# Patient Record
Sex: MALE | Race: WHITE | URBAN - METROPOLITAN AREA
[De-identification: names, ages, dates, MRNs, and addresses within clinical notes are randomized per-mention and may not be internally consistent; named-entity substitution may affect disease eponyms.]

---

## 2017-04-28 ENCOUNTER — HOSPITAL ENCOUNTER (OUTPATIENT)
Dept: CT IMAGING | Facility: HOSPITAL | Age: 42
Discharge: HOME | End: 2017-04-28
Attending: FAMILY MEDICINE
Payer: MEDICAID

## 2017-04-28 DIAGNOSIS — I26.09 OTHER PULMONARY EMBOLISM WITH ACUTE COR PULMONALE (HCC): ICD-10-CM

## 2017-04-28 PROCEDURE — 82565 ASSAY OF CREATININE: CPT

## 2017-04-28 PROCEDURE — 71260 CT THORAX DX C+: CPT

## 2017-05-03 ENCOUNTER — HOSPITAL ENCOUNTER (OUTPATIENT)
Dept: ULTRASOUND IMAGING | Facility: HOSPITAL | Age: 42
Discharge: HOME | End: 2017-05-03
Attending: FAMILY MEDICINE
Payer: MEDICAID

## 2017-05-03 DIAGNOSIS — I26.09 OTHER PULMONARY EMBOLISM WITH ACUTE COR PULMONALE (HCC): ICD-10-CM

## 2017-05-03 PROCEDURE — 93970 EXTREMITY STUDY: CPT

## 2018-08-30 ENCOUNTER — HOSPITAL (OUTPATIENT)
Dept: OTHER | Age: 43
End: 2018-08-30
Attending: INTERNAL MEDICINE

## 2018-08-30 LAB
A/G RATIO_: 1
ABS LYMPH: 1.8 K/CUMM (ref 1–3.5)
ABS MONO: 0.8 K/CUMM (ref 0.1–0.8)
ABS NEUTRO: 7.6 K/CUMM (ref 2–8)
ALBUMIN: 3.9 G/DL (ref 3.5–5)
ALK PHOS: 96 UNIT/L (ref 50–124)
ALT/GPT: 36 UNIT/L (ref 0–55)
ANION GAP SERPL CALC-SCNC: 14 MEQ/L (ref 10–20)
APTT PPP: 25 SECONDS (ref 22–30)
AST/GOT: 22 UNIT/L (ref 5–34)
BASOPHIL: 0 % (ref 0–1)
BILI TOTAL: 0.7 MG/DL (ref 0.2–1)
BUN SERPL-MCNC: 13 MG/DL (ref 6–20)
CALCIUM: 9.6 MG/DL (ref 8.4–10.2)
CHLORIDE: 103 MEQ/L (ref 97–107)
CREATININE: 0.84 MG/DL (ref 0.6–1.3)
D-DIMER: 2.89 MG/L FEU
DIFF_TYPE?: NORMAL
EOSINOPHIL: 1 % (ref 0–6)
GLOBULIN_: 3.8 G/DL (ref 2–4.1)
GLUCOSE LVL: 135 MG/DL (ref 70–99)
HCT VFR BLD CALC: 44 % (ref 36–51)
HEMOLYSIS 2+: NEGATIVE
HEMOLYSIS 4+: NEGATIVE
HEMOLYSIS 4+: NORMAL
HGB BLD-MCNC: 15.5 G/DL (ref 12–17)
ICTERIC 4+: NEGATIVE
ICTERIC 4+: NEGATIVE
IMMATURE GRAN: 0.4 % (ref 0–0.3)
INR: 1.7 (ref 0.9–1.1)
INSTR WBC: 10.3 K/CUMM (ref 4–11)
LIPEMIC 3+: NEGATIVE
LYMPHOCYTE: 17 %
MCH RBC QN AUTO: 31 PG (ref 25–35)
MCHC RBC AUTO-ENTMCNC: 35 G/DL (ref 32–37)
MCV RBC AUTO: 86 FL (ref 78–97)
MONOCYTE: 7 %
NEUTROPHIL: 73 %
NRBC BLD MANUAL-RTO: 0 % (ref 0–0.2)
PLATELET: 207 K/CUMM (ref 150–450)
POTASSIUM: 3.7 MEQ/L (ref 3.5–5.1)
PROTHROMBIN TIME: 16.6 SECONDS (ref 9.7–11.6)
RBC # BLD: 5.07 M/CUMM (ref 4.2–6)
RDW: 13.1 % (ref 11.5–14.5)
SODIUM: 137 MEQ/L (ref 136–145)
TCO2: 24 MEQ/L (ref 19–29)
TOTAL PROTEIN: 7.7 G/DL (ref 6.4–8.3)
TROPONIN I: 0.01 NG/ML
TROPONIN I: <0.01 NG/ML
WBC # BLD: 10.3 K/CUMM (ref 4–11)

## 2018-08-31 LAB
ABS NEUTRO: 6.5 K/CUMM (ref 2–8)
ANION GAP SERPL CALC-SCNC: 16 MEQ/L (ref 10–20)
APTT PPP: 48 SECONDS (ref 22–30)
APTT PPP: 64 SECONDS (ref 22–30)
BUN SERPL-MCNC: 13 MG/DL (ref 6–20)
CALCIUM: 8.9 MG/DL (ref 8.4–10.2)
CHLORIDE: 105 MEQ/L (ref 97–107)
CREATININE: 0.8 MG/DL (ref 0.6–1.3)
GLUCOSE LVL: 118 MG/DL (ref 70–99)
HCT VFR BLD CALC: 42 % (ref 36–51)
HEMOLYSIS 2+: ABNORMAL
HEMOLYSIS 4+: NEGATIVE
HGB BLD-MCNC: 14.9 G/DL (ref 12–17)
ICTERIC 4+: NEGATIVE
INSTR WBC: 10.4 K/CUMM (ref 4–11)
MCH RBC QN AUTO: 31 PG (ref 25–35)
MCHC RBC AUTO-ENTMCNC: 35 G/DL (ref 32–37)
MCV RBC AUTO: 88 FL (ref 78–97)
NRBC BLD MANUAL-RTO: 0 % (ref 0–0.2)
PLATELET: 191 K/CUMM (ref 150–450)
POTASSIUM: 4.1 MEQ/L (ref 3.5–5.1)
RBC # BLD: 4.82 M/CUMM (ref 4.2–6)
RDW: 13.2 % (ref 11.5–14.5)
SODIUM: 138 MEQ/L (ref 136–145)
TCO2: 21 MEQ/L (ref 19–29)
TROPONIN I: 0.01 NG/ML
WBC # BLD: 10.4 K/CUMM (ref 4–11)

## 2018-09-01 LAB
ABS NEUTRO: 3.5 K/CUMM (ref 2–8)
APTT PPP: 36 SECONDS (ref 22–30)
APTT PPP: 41 SECONDS (ref 22–30)
APTT PPP: 59 SECONDS (ref 22–30)
HCT VFR BLD CALC: 40 % (ref 36–51)
HGB BLD-MCNC: 13.8 G/DL (ref 12–17)
INR: 1.6 (ref 0.9–1.1)
INSTR WBC: 6.7 K/CUMM (ref 4–11)
MCH RBC QN AUTO: 31 PG (ref 25–35)
MCHC RBC AUTO-ENTMCNC: 34 G/DL (ref 32–37)
MCV RBC AUTO: 90 FL (ref 78–97)
NRBC BLD MANUAL-RTO: 0 % (ref 0–0.2)
PLATELET: 192 K/CUMM (ref 150–450)
PROTHROMBIN TIME: 15.1 SECONDS (ref 9.7–11.6)
RBC # BLD: 4.45 M/CUMM (ref 4.2–6)
RDW: 12.9 % (ref 11.5–14.5)
WBC # BLD: 6.7 K/CUMM (ref 4–11)

## 2018-09-02 LAB
ABS NEUTRO: 3.7 K/CUMM (ref 2–8)
APTT PPP: 54 SECONDS (ref 22–30)
APTT PPP: 59 SECONDS (ref 22–30)
HCT VFR BLD CALC: 39 % (ref 36–51)
HGB BLD-MCNC: 13.8 G/DL (ref 12–17)
INR: 1.4 (ref 0.9–1.1)
INSTR WBC: 7.2 K/CUMM (ref 4–11)
MCH RBC QN AUTO: 31 PG (ref 25–35)
MCHC RBC AUTO-ENTMCNC: 35 G/DL (ref 32–37)
MCV RBC AUTO: 88 FL (ref 78–97)
NRBC BLD MANUAL-RTO: 0 % (ref 0–0.2)
PLATELET: 215 K/CUMM (ref 150–450)
PROTHROMBIN TIME: 14.1 SECONDS (ref 9.7–11.6)
RBC # BLD: 4.47 M/CUMM (ref 4.2–6)
RDW: 13.1 % (ref 11.5–14.5)
WBC # BLD: 7.2 K/CUMM (ref 4–11)

## 2018-09-03 LAB
ABS NEUTRO: 3.3 K/CUMM (ref 2–8)
APTT PPP: 40 SECONDS (ref 22–30)
APTT PPP: 47 SECONDS (ref 22–30)
APTT PPP: 79 SECONDS (ref 22–30)
HCT VFR BLD CALC: 40 % (ref 36–51)
HGB BLD-MCNC: 14.1 G/DL (ref 12–17)
INR: 1.7 (ref 0.9–1.1)
INSTR WBC: 6.8 K/CUMM (ref 4–11)
MCH RBC QN AUTO: 31 PG (ref 25–35)
MCHC RBC AUTO-ENTMCNC: 36 G/DL (ref 32–37)
MCV RBC AUTO: 87 FL (ref 78–97)
NRBC BLD MANUAL-RTO: 0 % (ref 0–0.2)
PLATELET: 222 K/CUMM (ref 150–450)
PROTHROMBIN TIME: 16.2 SECONDS (ref 9.7–11.6)
RBC # BLD: 4.55 M/CUMM (ref 4.2–6)
RDW: 13 % (ref 11.5–14.5)
WBC # BLD: 6.8 K/CUMM (ref 4–11)

## 2018-09-04 LAB
ABS NEUTRO: 3.4 K/CUMM (ref 2–8)
APTT PPP: 53 SECONDS (ref 22–30)
APTT PPP: 55 SECONDS (ref 22–30)
HCT VFR BLD CALC: 42 % (ref 36–51)
HGB BLD-MCNC: 14.6 G/DL (ref 12–17)
INR: 1.8 (ref 0.9–1.1)
INSTR WBC: 6.3 K/CUMM (ref 4–11)
MCH RBC QN AUTO: 32 PG (ref 25–35)
MCHC RBC AUTO-ENTMCNC: 35 G/DL (ref 32–37)
MCV RBC AUTO: 90 FL (ref 78–97)
NRBC BLD MANUAL-RTO: 0 % (ref 0–0.2)
PLATELET: 244 K/CUMM (ref 150–450)
PROTHROMBIN TIME: 17.3 SECONDS (ref 9.7–11.6)
RBC # BLD: 4.64 M/CUMM (ref 4.2–6)
RDW: 13 % (ref 11.5–14.5)
WBC # BLD: 6.3 K/CUMM (ref 4–11)

## 2018-09-05 LAB
ABS NEUTRO: 4 K/CUMM (ref 2–8)
APTT PPP: 54 SECONDS (ref 22–30)
HCT VFR BLD CALC: 40 % (ref 36–51)
HGB BLD-MCNC: 14.2 G/DL (ref 12–17)
INR: 2 (ref 0.9–1.1)
INSTR WBC: 7.5 K/CUMM (ref 4–11)
MCH RBC QN AUTO: 31 PG (ref 25–35)
MCHC RBC AUTO-ENTMCNC: 35 G/DL (ref 32–37)
MCV RBC AUTO: 87 FL (ref 78–97)
NRBC BLD MANUAL-RTO: 0 % (ref 0–0.2)
PLATELET: 242 K/CUMM (ref 150–450)
PROTHROMBIN TIME: 18.6 SECONDS (ref 9.7–11.6)
RBC # BLD: 4.62 M/CUMM (ref 4.2–6)
RDW: 13.2 % (ref 11.5–14.5)
WBC # BLD: 7.5 K/CUMM (ref 4–11)

## 2018-09-06 LAB
ABS NEUTRO: 3.9 K/CUMM (ref 2–8)
APTT PPP: 64 SECONDS (ref 22–30)
HCT VFR BLD CALC: 42 % (ref 36–51)
HGB BLD-MCNC: 15.1 G/DL (ref 12–17)
INR: 2 (ref 0.9–1.1)
INSTR WBC: 7.7 K/CUMM (ref 4–11)
MCH RBC QN AUTO: 32 PG (ref 25–35)
MCHC RBC AUTO-ENTMCNC: 36 G/DL (ref 32–37)
MCV RBC AUTO: 89 FL (ref 78–97)
NRBC BLD MANUAL-RTO: 0 % (ref 0–0.2)
PLATELET: 257 K/CUMM (ref 150–450)
PROTHROMBIN TIME: 19.2 SECONDS (ref 9.7–11.6)
RBC # BLD: 4.69 M/CUMM (ref 4.2–6)
RDW: 13.2 % (ref 11.5–14.5)
WBC # BLD: 7.7 K/CUMM (ref 4–11)

## 2018-12-02 ENCOUNTER — HOSPITAL (OUTPATIENT)
Dept: OTHER | Age: 43
End: 2018-12-02
Attending: FAMILY MEDICINE

## 2020-09-03 ENCOUNTER — HOSPITAL (OUTPATIENT)
Dept: OTHER | Age: 45
End: 2020-09-03
Attending: EMERGENCY MEDICINE

## 2020-09-03 LAB
A/G RATIO_: 0.9
A/G RATIO_: 0.9
ABS LYMPH: 1.6 K/CUMM (ref 1–3.5)
ABS LYMPH: 1.6 K/CUMM (ref 1–3.5)
ABS MONO: 0.5 K/CUMM (ref 0.1–0.8)
ABS MONO: 0.5 K/CUMM (ref 0.1–0.8)
ABS NEUTRO: 4.5 K/CUMM (ref 2–8)
ABS NEUTRO: 4.5 K/CUMM (ref 2–8)
ALBUMIN: 3.7 G/DL (ref 3.5–5)
ALBUMIN: 3.7 G/DL (ref 3.5–5)
ALK PHOS: 93 UNIT/L (ref 50–124)
ALK PHOS: 93 UNIT/L (ref 50–124)
ALT/GPT: 36 UNIT/L (ref 0–55)
ALT/GPT: 36 UNIT/L (ref 0–55)
ANION GAP SERPL CALC-SCNC: 14 MEQ/L (ref 10–20)
ANION GAP SERPL CALC-SCNC: 14 MEQ/L (ref 10–20)
APTT PPP: 23 SECOND(S) (ref 22–30)
APTT PPP: 23 SECONDS (ref 22–30)
AST/GOT: 21 UNIT/L (ref 5–34)
AST/GOT: 21 UNIT/L (ref 5–34)
BASOPHIL: 1 % (ref 0–1)
BASOPHIL: 1 % (ref 0–1)
BILI TOTAL: 0.3 MG/DL (ref 0.2–1)
BILI TOTAL: 0.3 MG/DL (ref 0.2–1)
BUN SERPL-MCNC: 13 MG/DL (ref 6–20)
BUN SERPL-MCNC: 13 MG/DL (ref 6–20)
CALCIUM: 9 MG/DL (ref 8.4–10.2)
CALCIUM: 9 MG/DL (ref 8.4–10.2)
CHLORIDE: 103 MEQ/L (ref 97–107)
CHLORIDE: 103 MEQ/L (ref 97–107)
CREATININE: 0.92 MG/DL (ref 0.6–1.3)
CREATININE: 0.92 MG/DL (ref 0.6–1.3)
CRP INFLAMMATION: 8.5 MG/L (ref 0.1–8.2)
CRP INFLAMMATION: 8.5 MG/L (ref 0.1–8.2)
DIFF_TYPE?: NORMAL
EOSINOPHIL: 2 % (ref 0–6)
EOSINOPHIL: 2 % (ref 0–6)
FERRITIN: 161.6 NG/ML (ref 22–275)
FERRITIN: 161.6 NG/ML (ref 22–275)
GLOBULIN_: 4.2 G/DL (ref 2–4.1)
GLOBULIN_: 4.2 G/DL (ref 2–4.1)
GLUCOSE LVL: 121 MG/DL (ref 70–99)
GLUCOSE LVL: 121 MG/DL (ref 70–99)
HCT VFR BLD CALC: 40 % (ref 36–51)
HCT VFR BLD CALC: 40 % (ref 36–51)
HEMOLYSIS 1+: NEGATIVE
HEMOLYSIS 2+: NEGATIVE
HEMOLYSIS 3+: NEGATIVE
HEMOLYSIS 4+: NEGATIVE
HGB BLD-MCNC: 14.4 G/DL (ref 12–17)
HGB BLD-MCNC: 14.4 G/DL (ref 12–17)
ICTERIC 4+: NEGATIVE
IMMATURE GRAN: 0.4 % (ref 0–0.3)
IMMATURE GRAN: 0.4 % (ref 0–0.3)
INR: 1.1 (ref 0.9–1.1)
INR: 1.1 (ref 0.9–1.1)
INSTR WBC: 6.9 K/CUMM (ref 4–11)
LACTIC ACID: 1.5 MMOL/L (ref 0.5–2)
LACTIC ACID: 1.5 MMOL/L (ref 0.5–2)
LDH SERPL L TO P-CCNC: 193 UNIT/L (ref 125–220)
LDH SERPL L TO P-CCNC: 193 UNIT/L (ref 125–220)
LIPEMIC 1+: NEGATIVE
LIPEMIC 3+: ABNORMAL
LYMPHOCYTE: 23 %
LYMPHOCYTE: 23 %
MCH RBC QN AUTO: 32 PG (ref 25–35)
MCH RBC QN AUTO: 32 PG (ref 25–35)
MCHC RBC AUTO-ENTMCNC: 36 G/DL (ref 32–37)
MCHC RBC AUTO-ENTMCNC: 36 G/DL (ref 32–37)
MCV RBC AUTO: 88 FL (ref 78–97)
MCV RBC AUTO: 88 FL (ref 78–97)
MONOCYTE: 8 %
MONOCYTE: 8 %
NEUTROPHIL: 65 %
NEUTROPHIL: 65 %
NRBC BLD MANUAL-RTO: 0 % (ref 0–0.2)
NRBC BLD MANUAL-RTO: 0 % (ref 0–0.2)
PLATELET: 307 K/CUMM (ref 150–450)
PLATELET: 307 K/CUMM (ref 150–450)
POTASSIUM: 3.9 MEQ/L (ref 3.5–5.1)
POTASSIUM: 3.9 MEQ/L (ref 3.5–5.1)
PROCALCITONIN LVL: <0.05
PROCALCITONIN LVL: <0.05
PROTHROMBIN TIME: 11.3 SECOND(S) (ref 9.7–11.6)
PROTHROMBIN TIME: 11.3 SECONDS (ref 9.7–11.6)
RBC # BLD: 4.49 M/CUMM (ref 4.2–6)
RBC # BLD: 4.49 M/CUMM (ref 4.2–6)
RDW: 13.2 % (ref 11.5–14.5)
RDW: 13.2 % (ref 11.5–14.5)
SODIUM: 138 MEQ/L (ref 136–145)
SODIUM: 138 MEQ/L (ref 136–145)
TCO2: 25 MEQ/L (ref 19–29)
TCO2: 25 MEQ/L (ref 19–29)
TOTAL PROTEIN: 7.9 G/DL (ref 6.4–8.3)
TOTAL PROTEIN: 7.9 G/DL (ref 6.4–8.3)
WBC # BLD: 6.9 K/CUMM (ref 4–11)
WBC # BLD: 6.9 K/CUMM (ref 4–11)

## 2020-09-04 LAB
REPORT TEXT: NORMAL
REPORT TEXT: NORMAL

## 2021-10-03 ENCOUNTER — WALK IN (OUTPATIENT)
Dept: URGENT CARE | Age: 46
End: 2021-10-03

## 2021-10-03 VITALS
SYSTOLIC BLOOD PRESSURE: 138 MMHG | TEMPERATURE: 97.8 F | RESPIRATION RATE: 18 BRPM | OXYGEN SATURATION: 97 % | HEART RATE: 70 BPM | DIASTOLIC BLOOD PRESSURE: 87 MMHG

## 2021-10-03 DIAGNOSIS — Z20.2 POSSIBLE EXPOSURE TO STD: Primary | ICD-10-CM

## 2021-10-03 LAB
APPEARANCE, POC: ABNORMAL
BILIRUBIN, POC: NEGATIVE
COLOR, POC: YELLOW
GLUCOSE UR-MCNC: NEGATIVE MG/DL
KETONES, POC: NEGATIVE MG/DL
NITRITE, POC: NEGATIVE
OCCULT BLOOD, POC: NEGATIVE
PH UR: 6 [PH] (ref 5–7)
PROT UR-MCNC: NEGATIVE MG/DL
SP GR UR: 1.03 (ref 1–1.03)
UROBILINOGEN UR-MCNC: 0.2 MG/DL (ref 0–1)
WBC (LEUKOCYTE) ESTERASE, POC: ABNORMAL

## 2021-10-03 PROCEDURE — 99212 OFFICE O/P EST SF 10 MIN: CPT

## 2021-10-03 PROCEDURE — 81003 URINALYSIS AUTO W/O SCOPE: CPT | Performed by: FAMILY MEDICINE

## 2021-10-03 PROCEDURE — 87591 N.GONORRHOEAE DNA AMP PROB: CPT | Performed by: FAMILY MEDICINE

## 2021-10-03 PROCEDURE — 87086 URINE CULTURE/COLONY COUNT: CPT | Performed by: FAMILY MEDICINE

## 2021-10-03 PROCEDURE — 10002800 HB RX 250 W HCPCS: Performed by: FAMILY MEDICINE

## 2021-10-03 PROCEDURE — 96372 THER/PROPH/DIAG INJ SC/IM: CPT

## 2021-10-03 PROCEDURE — 10002801 HB RX 250 W/O HCPCS: Performed by: FAMILY MEDICINE

## 2021-10-03 RX ORDER — DOXYCYCLINE HYCLATE 100 MG
100 TABLET ORAL 2 TIMES DAILY
Qty: 14 TABLET | Refills: 0 | Status: SHIPPED | OUTPATIENT
Start: 2021-10-03 | End: 2021-10-10

## 2021-10-03 RX ADMIN — LIDOCAINE HYDROCHLORIDE 250 MG: 10 INJECTION, SOLUTION EPIDURAL; INFILTRATION; INTRACAUDAL; PERINEURAL at 16:41

## 2021-10-03 ASSESSMENT — ENCOUNTER SYMPTOMS
HEADACHES: 0
SINUS PAIN: 0
ABDOMINAL PAIN: 0
DIARRHEA: 0
FEVER: 0
ADENOPATHY: 0
SORE THROAT: 0
FATIGUE: 0
SHORTNESS OF BREATH: 0
EYE PAIN: 0
COUGH: 0
NAUSEA: 0
APPETITE CHANGE: 0
VOMITING: 0
CHEST TIGHTNESS: 0
DIZZINESS: 0
SLEEP DISTURBANCE: 0
WHEEZING: 0

## 2021-10-03 ASSESSMENT — PAIN SCALES - GENERAL: PAINLEVEL: 0

## 2021-10-05 LAB — BACTERIA UR CULT: NORMAL

## 2021-10-06 ENCOUNTER — TELEPHONE (OUTPATIENT)
Dept: URGENT CARE | Age: 46
End: 2021-10-06

## 2021-10-06 LAB
C TRACH RRNA UR QL NAA+PROBE: NEGATIVE
Lab: ABNORMAL
N GONORRHOEA RRNA UR QL NAA+PROBE: POSITIVE

## 2021-11-13 ENCOUNTER — WALK IN (OUTPATIENT)
Dept: URGENT CARE | Age: 46
End: 2021-11-13
Attending: EMERGENCY MEDICINE

## 2021-11-13 VITALS
OXYGEN SATURATION: 95 % | TEMPERATURE: 98 F | HEART RATE: 90 BPM | DIASTOLIC BLOOD PRESSURE: 84 MMHG | HEIGHT: 75 IN | BODY MASS INDEX: 31.08 KG/M2 | RESPIRATION RATE: 18 BRPM | WEIGHT: 250 LBS | SYSTOLIC BLOOD PRESSURE: 129 MMHG

## 2021-11-13 DIAGNOSIS — J30.9 ALLERGIC RHINITIS, UNSPECIFIED SEASONALITY, UNSPECIFIED TRIGGER: Primary | ICD-10-CM

## 2021-11-13 PROCEDURE — 99213 OFFICE O/P EST LOW 20 MIN: CPT

## 2021-11-13 RX ORDER — FLUTICASONE PROPIONATE 50 MCG
2 SPRAY, SUSPENSION (ML) NASAL DAILY
Qty: 16 G | Refills: 12 | Status: SHIPPED | OUTPATIENT
Start: 2021-11-13

## 2021-11-13 RX ORDER — DIPHENHYDRAMINE HCL 25 MG
25 CAPSULE ORAL EVERY 6 HOURS PRN
COMMUNITY
Start: 2021-11-13

## 2021-11-13 RX ORDER — AMOXICILLIN AND CLAVULANATE POTASSIUM 875; 125 MG/1; MG/1
1 TABLET, FILM COATED ORAL EVERY 12 HOURS
Qty: 20 TABLET | Refills: 0 | Status: SHIPPED | OUTPATIENT
Start: 2021-11-13

## 2021-11-13 ASSESSMENT — ENCOUNTER SYMPTOMS
COUGH: 0
BACK PAIN: 0
VOMITING: 0
FEVER: 0
CONFUSION: 0
NAUSEA: 0
CHILLS: 0
SHORTNESS OF BREATH: 0
HEADACHES: 0
ABDOMINAL PAIN: 0
SINUS PAIN: 0

## 2021-11-13 ASSESSMENT — PAIN SCALES - GENERAL: PAINLEVEL: 0

## 2021-12-29 ENCOUNTER — HOSPITAL ENCOUNTER (OUTPATIENT)
Dept: GENERAL RADIOLOGY | Age: 46
Discharge: HOME | End: 2021-12-29
Attending: EMERGENCY MEDICINE

## 2021-12-29 ENCOUNTER — WALK IN (OUTPATIENT)
Dept: URGENT CARE | Age: 46
End: 2021-12-29

## 2021-12-29 VITALS
RESPIRATION RATE: 18 BRPM | BODY MASS INDEX: 31.25 KG/M2 | HEART RATE: 87 BPM | DIASTOLIC BLOOD PRESSURE: 92 MMHG | OXYGEN SATURATION: 97 % | TEMPERATURE: 98.8 F | SYSTOLIC BLOOD PRESSURE: 134 MMHG | WEIGHT: 250 LBS

## 2021-12-29 DIAGNOSIS — M25.511 RIGHT SHOULDER PAIN, UNSPECIFIED CHRONICITY: Primary | ICD-10-CM

## 2021-12-29 DIAGNOSIS — M25.511 RIGHT SHOULDER PAIN, UNSPECIFIED CHRONICITY: ICD-10-CM

## 2021-12-29 PROCEDURE — 99212 OFFICE O/P EST SF 10 MIN: CPT

## 2021-12-29 PROCEDURE — 73030 X-RAY EXAM OF SHOULDER: CPT

## 2021-12-29 ASSESSMENT — ENCOUNTER SYMPTOMS
BACK PAIN: 0
VOMITING: 0
ABDOMINAL PAIN: 0
CONFUSION: 0
FEVER: 0
COUGH: 0
HEADACHES: 0
NAUSEA: 0
CHILLS: 0
SINUS PAIN: 0
SHORTNESS OF BREATH: 0

## 2021-12-29 ASSESSMENT — PAIN SCALES - GENERAL: PAINLEVEL: 6

## 2022-03-24 ENCOUNTER — HOSPITAL ENCOUNTER (OUTPATIENT)
Dept: GENERAL RADIOLOGY | Age: 47
Discharge: HOME | End: 2022-03-24
Attending: FAMILY MEDICINE

## 2022-03-24 ENCOUNTER — WALK IN (OUTPATIENT)
Dept: URGENT CARE | Age: 47
End: 2022-03-24
Attending: FAMILY MEDICINE

## 2022-03-24 VITALS
OXYGEN SATURATION: 97 % | HEART RATE: 85 BPM | TEMPERATURE: 98.2 F | SYSTOLIC BLOOD PRESSURE: 143 MMHG | RESPIRATION RATE: 14 BRPM | DIASTOLIC BLOOD PRESSURE: 76 MMHG

## 2022-03-24 DIAGNOSIS — M54.2 NECK PAIN: Primary | ICD-10-CM

## 2022-03-24 DIAGNOSIS — M54.2 NECK PAIN: ICD-10-CM

## 2022-03-24 PROCEDURE — 99212 OFFICE O/P EST SF 10 MIN: CPT

## 2022-03-24 PROCEDURE — 72050 X-RAY EXAM NECK SPINE 4/5VWS: CPT

## 2022-03-24 RX ORDER — CYCLOBENZAPRINE HCL 10 MG
10 TABLET ORAL 3 TIMES DAILY PRN
Qty: 30 TABLET | Refills: 0 | Status: SHIPPED | OUTPATIENT
Start: 2022-03-24 | End: 2022-03-24 | Stop reason: SDUPTHER

## 2022-03-24 RX ORDER — CYCLOBENZAPRINE HCL 10 MG
10 TABLET ORAL 3 TIMES DAILY PRN
Qty: 30 TABLET | Refills: 0 | Status: SHIPPED | OUTPATIENT
Start: 2022-03-24 | End: 2022-04-03

## 2022-03-24 ASSESSMENT — ENCOUNTER SYMPTOMS
POLYDIPSIA: 0
BACK PAIN: 0
EYE REDNESS: 0
HEADACHES: 0
AGITATION: 0
FEVER: 0
WOUND: 0
EYE PAIN: 0
VOMITING: 0
DIZZINESS: 0
ADENOPATHY: 0
WEAKNESS: 0
VOICE CHANGE: 0
NAUSEA: 0
STRIDOR: 0
CONSTIPATION: 0
POLYPHAGIA: 0
NERVOUS/ANXIOUS: 0
FATIGUE: 0
SPEECH DIFFICULTY: 0
WHEEZING: 0
COUGH: 0
COLOR CHANGE: 0
SINUS PAIN: 0
SORE THROAT: 0
DIARRHEA: 0
SHORTNESS OF BREATH: 0
BLOOD IN STOOL: 0
EYE DISCHARGE: 0
ABDOMINAL PAIN: 0
SINUS PRESSURE: 0
CHEST TIGHTNESS: 0
BRUISES/BLEEDS EASILY: 0

## 2022-03-24 ASSESSMENT — PAIN SCALES - GENERAL: PAINLEVEL: 7

## 2022-03-28 ENCOUNTER — TELEPHONE (OUTPATIENT)
Dept: URGENT CARE | Age: 47
End: 2022-03-28

## 2023-10-11 DIAGNOSIS — M75.41 IMPINGEMENT SYNDROME OF RIGHT SHOULDER: ICD-10-CM

## 2023-10-11 DIAGNOSIS — M23.91 INTERNAL DERANGEMENT OF KNEE, RIGHT: Primary | ICD-10-CM

## 2023-10-20 DIAGNOSIS — M23.91 INTERNAL DERANGEMENT OF KNEE, RIGHT: Primary | ICD-10-CM

## 2023-10-25 ENCOUNTER — APPOINTMENT (OUTPATIENT)
Dept: MRI IMAGING | Age: 48
End: 2023-10-25
Attending: ORTHOPAEDIC SURGERY

## 2023-11-02 ENCOUNTER — HOSPITAL ENCOUNTER (OUTPATIENT)
Dept: MRI IMAGING | Age: 48
Discharge: HOME | End: 2023-11-02
Attending: ORTHOPAEDIC SURGERY

## 2023-11-02 DIAGNOSIS — M75.41 IMPINGEMENT SYNDROME OF RIGHT SHOULDER: ICD-10-CM

## 2023-11-02 DIAGNOSIS — M23.91 INTERNAL DERANGEMENT OF KNEE, RIGHT: ICD-10-CM

## 2023-11-02 PROCEDURE — 73721 MRI JNT OF LWR EXTRE W/O DYE: CPT

## 2023-11-02 PROCEDURE — G1004 CDSM NDSC: HCPCS

## 2023-11-02 PROCEDURE — 73221 MRI JOINT UPR EXTREM W/O DYE: CPT

## 2024-02-24 ENCOUNTER — APPOINTMENT (OUTPATIENT)
Dept: ULTRASOUND IMAGING | Facility: HOSPITAL | Age: 49
End: 2024-02-24
Attending: EMERGENCY MEDICINE
Payer: MEDICAID

## 2024-02-24 ENCOUNTER — APPOINTMENT (OUTPATIENT)
Dept: GENERAL RADIOLOGY | Facility: HOSPITAL | Age: 49
End: 2024-02-24
Attending: EMERGENCY MEDICINE
Payer: MEDICAID

## 2024-02-24 ENCOUNTER — HOSPITAL ENCOUNTER (EMERGENCY)
Facility: HOSPITAL | Age: 49
Discharge: HOME | End: 2024-02-24
Attending: EMERGENCY MEDICINE
Payer: MEDICAID

## 2024-02-24 VITALS
HEART RATE: 101 BPM | BODY MASS INDEX: 33.57 KG/M2 | DIASTOLIC BLOOD PRESSURE: 100 MMHG | WEIGHT: 270 LBS | RESPIRATION RATE: 18 BRPM | TEMPERATURE: 98 F | HEIGHT: 75 IN | OXYGEN SATURATION: 92 % | SYSTOLIC BLOOD PRESSURE: 134 MMHG

## 2024-02-24 DIAGNOSIS — R60.0 EDEMA OF LEFT LOWER EXTREMITY: Primary | ICD-10-CM

## 2024-02-24 DIAGNOSIS — M79.672 LEFT FOOT PAIN: ICD-10-CM

## 2024-02-24 LAB
ANION GAP SERPL CALC-SCNC: 6 MMOL/L (ref 0–18)
BASOPHILS # BLD AUTO: 0.06 X10(3) UL (ref 0–0.2)
BASOPHILS NFR BLD AUTO: 0.8 %
BUN BLD-MCNC: 12 MG/DL (ref 9–23)
BUN/CREAT SERPL: 14.3 (ref 10–20)
CALCIUM BLD-MCNC: 9.7 MG/DL (ref 8.7–10.4)
CHLORIDE SERPL-SCNC: 106 MMOL/L (ref 98–112)
CO2 SERPL-SCNC: 30 MMOL/L (ref 21–32)
CREAT BLD-MCNC: 0.84 MG/DL
DEPRECATED RDW RBC AUTO: 44.1 FL (ref 35.1–46.3)
EGFRCR SERPLBLD CKD-EPI 2021: 108 ML/MIN/1.73M2 (ref 60–?)
EOSINOPHIL # BLD AUTO: 0.28 X10(3) UL (ref 0–0.7)
EOSINOPHIL NFR BLD AUTO: 3.6 %
ERYTHROCYTE [DISTWIDTH] IN BLOOD BY AUTOMATED COUNT: 13.6 % (ref 11–15)
GLUCOSE BLD-MCNC: 101 MG/DL (ref 70–99)
HCT VFR BLD AUTO: 42.4 %
HGB BLD-MCNC: 14.9 G/DL
IMM GRANULOCYTES # BLD AUTO: 0.02 X10(3) UL (ref 0–1)
IMM GRANULOCYTES NFR BLD: 0.3 %
LYMPHOCYTES # BLD AUTO: 2.15 X10(3) UL (ref 1–4)
LYMPHOCYTES NFR BLD AUTO: 27.9 %
MCH RBC QN AUTO: 31.2 PG (ref 26–34)
MCHC RBC AUTO-ENTMCNC: 35.1 G/DL (ref 31–37)
MCV RBC AUTO: 88.7 FL
MONOCYTES # BLD AUTO: 0.56 X10(3) UL (ref 0.1–1)
MONOCYTES NFR BLD AUTO: 7.3 %
NEUTROPHILS # BLD AUTO: 4.64 X10 (3) UL (ref 1.5–7.7)
NEUTROPHILS # BLD AUTO: 4.64 X10(3) UL (ref 1.5–7.7)
NEUTROPHILS NFR BLD AUTO: 60.1 %
OSMOLALITY SERPL CALC.SUM OF ELEC: 294 MOSM/KG (ref 275–295)
PLATELET # BLD AUTO: 259 10(3)UL (ref 150–450)
POTASSIUM SERPL-SCNC: 4.4 MMOL/L (ref 3.5–5.1)
RBC # BLD AUTO: 4.78 X10(6)UL
SODIUM SERPL-SCNC: 142 MMOL/L (ref 136–145)
WBC # BLD AUTO: 7.7 X10(3) UL (ref 4–11)

## 2024-02-24 PROCEDURE — 93971 EXTREMITY STUDY: CPT | Performed by: EMERGENCY MEDICINE

## 2024-02-24 PROCEDURE — 85025 COMPLETE CBC W/AUTO DIFF WBC: CPT | Performed by: EMERGENCY MEDICINE

## 2024-02-24 PROCEDURE — 73630 X-RAY EXAM OF FOOT: CPT | Performed by: EMERGENCY MEDICINE

## 2024-02-24 PROCEDURE — 99285 EMERGENCY DEPT VISIT HI MDM: CPT

## 2024-02-24 PROCEDURE — 80048 BASIC METABOLIC PNL TOTAL CA: CPT | Performed by: EMERGENCY MEDICINE

## 2024-02-24 PROCEDURE — 73610 X-RAY EXAM OF ANKLE: CPT | Performed by: EMERGENCY MEDICINE

## 2024-02-24 RX ORDER — SULFAMETHOXAZOLE AND TRIMETHOPRIM 800; 160 MG/1; MG/1
1 TABLET ORAL 2 TIMES DAILY
Qty: 14 TABLET | Refills: 0 | Status: SHIPPED | OUTPATIENT
Start: 2024-02-24 | End: 2024-03-02

## 2024-02-24 NOTE — ED INITIAL ASSESSMENT (HPI)
Pt. Reports redness, swelling, pain x 2 weeks in L foot and lower leg. Reports going to an IC a week ago and was prescribed Cephalexin 500 mg (3 daily x 10 days). Reports it has been getting worse since then.    Swelling noted in triage on L foot, no known traumatic event.

## 2024-02-25 NOTE — DISCHARGE INSTRUCTIONS
Please wear tight compression stockings at all times throughout the day and elevate the leg is much as possible anytime you are sitting or lying down.  Complete your cephalexin antibiotic and please start taking the Bactrim antibiotic.  Please follow-up with podiatry.  Return to the ER if your toes are turning black or blue, if you are not able to put any weight on the foot, if your feet are cold, or other concerns.

## 2024-02-25 NOTE — ED PROVIDER NOTES
Patient Seen in: Health system Emergency Department      History     Chief Complaint   Patient presents with    Cellulitis     L foot     Stated Complaint: Foot infection    Subjective:   HPI    48 yoM presents for evaluation of L foot pain. Pain started 2-2.5 weeks ago at the bottom of his foot.  He also noted swelling to the left lower leg and foot.  No preceding trauma.  No fever or chills.  He changed his footwear but did not have improvement. He was started on Keflex on 2/19/24 which was prescribed at an immediate care and did not see any improvement.  No chest pain or dyspnea.  He was diagnosed with multiple PEs while homeless and was told he needed prolonged anticoagulation but he discontinued the anticoagulation about 3 years ago.  He has had multiple evaluations for PE since then which were negative.    Objective:   Past Medical History:   Diagnosis Date    PE (pulmonary thromboembolism) ([facility]) 2016              History reviewed. No pertinent surgical history.             Social History     Socioeconomic History    Marital status: Single   Tobacco Use    Smoking status: Never   Vaping Use    Vaping Use: Never used   Substance and Sexual Activity    Alcohol use: Not Currently    Drug use: Never              Review of Systems    Positive for stated complaint: Foot infection  Other systems are as noted in HPI.  Constitutional and vital signs reviewed.      All other systems reviewed and negative except as noted above.    Physical Exam     ED Triage Vitals [02/24/24 1741]   BP (!) 141/97   Pulse 94   Resp 18   Temp 97.9 °F (36.6 °C)   Temp src Oral   SpO2 100 %   O2 Device None (Room air)       Current:BP (!) 134/100   Pulse 101   Temp 97.9 °F (36.6 °C) (Oral)   Resp 18   Ht 190.5 cm (6' 3\")   Wt 122.5 kg   SpO2 92%   BMI 33.75 kg/m²         Physical Exam  Vitals and nursing note reviewed.   Constitutional:       Appearance: He is well-developed.   HENT:      Head: Normocephalic and atraumatic.    Eyes:      Extraocular Movements: Extraocular movements intact.   Cardiovascular:      Rate and Rhythm: Normal rate and regular rhythm.      Heart sounds: Normal heart sounds.      Comments: Bilateral DP and PT pulses are 2+  Pulmonary:      Effort: Pulmonary effort is normal.      Breath sounds: Normal breath sounds.   Abdominal:      General: There is no distension.      Palpations: Abdomen is soft.      Tenderness: There is no abdominal tenderness.   Musculoskeletal:         General: Normal range of motion.      Cervical back: Normal range of motion.      Comments: Full range of motion of bilateral hips, knees, ankles and feet.  Muscular compartments are soft throughout bilateral lower extremities.  There is no focal area of tenderness at the left foot or ankle   Skin:     General: Skin is warm.      Comments: At the left distal medial leg there is a small localized area of erythema and warmth approximately 3 cm, with surrounding edema no fluctuance, no induration, no crepitus, no vesicles, no ecchymosis   Neurological:      Mental Status: He is alert.      Comments: No focal deficits       Differential diagnose includes but is not limited to occult fracture, osteoarthritis, gouty arthritis, DVT, cellulitis        ED Course     Labs Reviewed   BASIC METABOLIC PANEL (8) - Abnormal; Notable for the following components:       Result Value    Glucose 101 (*)     All other components within normal limits   CBC WITH DIFFERENTIAL WITH PLATELET    Narrative:     The following orders were created for panel order CBC With Differential With Platelet.  Procedure                               Abnormality         Status                     ---------                               -----------         ------                     CBC W/ DIFFERENTIAL[[id number]]                              Final result                 Please view results for these tests on the individual orders.   CBC W/ DIFFERENTIAL          ED Course as of  02/24/24 2357  ------------------------------------------------------------  Time: 02/24 1950  Comment: CBC and BMP unremarkable  ------------------------------------------------------------  Time: 02/24 2036  Comment: X-ray foot and ankle per my independent interpretation unremarkable     XR ANKLE (MIN 3 VIEWS), LEFT (CPT=73610)    Result Date: 2/24/2024  CONCLUSION:  1. Soft tissue swelling.  Otherwise no acute finding.    Dictated by (CST): Rodrigo Ng MD on 2/24/2024 at 8:28 PM     Finalized by (CST): Rodrigo Ng MD on 2/24/2024 at 8:29 PM          XR FOOT, COMPLETE (MIN 3 VIEWS), LEFT (CPT=73630)    Result Date: 2/24/2024  CONCLUSION:  1. No acute finding    Dictated by (CST): Rodrigo Ng MD on 2/24/2024 at 8:27 PM     Finalized by (CST): Rodrigo Ng MD on 2/24/2024 at 8:28 PM           Preliminary Radiology Report  [facility], [facility]  [phone] - Phone    [facility], [place]    NAME: DESIRAE WATSON    DATE OF EXAM: 02/24/2024  Patient No:  [id number]  Physician:  MAGDALENA  YOB: 1975    Past Medical History (entered by Technologist):    Reason For Exam (entered by Technologist):  LT LEG PAIN- EVAL FOR DVT  Other Notes (entered by Technologist): Patient presents for LT foot/ankle pain    Prior US 05/13/2017      No DVT seen    Additional Information (per [facility] Radiologist):        LEFT LOWER EXTREMITY DUPLEX ULTRASOUND:  IMPRESSION    No evidence of deep venous thrombosis.  Normal compression, augmentation, and blood flow.      Case faxed/finalized at 12:09 AM eastern time .        Michael Blanchard MD  This report has been electronically signed and verified by the Radiologist whose name is printed above.    DD:  02/24/2024/DT:  02/25/2024       MDM                                Medical Decision Making  Patient well-appearing and ambulatory, no evidence of neurovascular compromise.  Labs and imaging reviewed as above.  Additionally left lower extremity ultrasound is  unremarkable.  Advised compression stockings, elevation, supportive footwear, and podiatry follow-up.  Additionally advise completion of his Keflex antibiotic and will add on Bactrim for broader coverage.  Return precautions discussed for any worsening of his pain, inability to bear weight, loss of strength or sensation, discoloration to the toes, paresthesias or other concerns and he is comfortable with the plan.        Problems Addressed:  Edema of left lower extremity: acute illness or injury with systemic symptoms  Left foot pain: acute illness or injury with systemic symptoms    Amount and/or Complexity of Data Reviewed  External Data Reviewed: notes.     Details:      I reviewed immediate care note from 2/19/2024 where patient was diagnosed with cellulitis and started on Keflex.  Labs: ordered. Decision-making details documented in ED Course.  Radiology: ordered and independent interpretation performed. Decision-making details documented in ED Course.    Risk  Prescription drug management.  Decision regarding hospitalization.  Risk Details: No clear negation for hospitalization at this time        Disposition and Plan     Clinical Impression:  1. Edema of left lower extremity    2. Left foot pain         Disposition:  Discharge  2/24/2024 11:25 pm    Follow-up:  Mark Verduzco  [address] [address]  [facility] [address]  [phone]    Follow up      Mark Verduzco DPM  [address] [facility] [address]  [address]  [facility] [place] [address]  [phone]          Mark Verduzco DPM  [address]  [address]  [facility] [address] [phone]                Medications Prescribed:  Discharge Medication List as of 2/24/2024 11:32 PM        START taking these medications    Details   sulfamethoxazole-trimethoprim -160 MG Oral Tab per tablet Take 1 tablet by mouth 2 (two) times daily for 7 days., Print, Disp-14 tablet, R-0

## 2024-04-14 ENCOUNTER — WALK IN (OUTPATIENT)
Dept: URGENT CARE | Age: 49
End: 2024-04-14
Attending: FAMILY MEDICINE

## 2024-04-14 VITALS
TEMPERATURE: 98.2 F | OXYGEN SATURATION: 97 % | DIASTOLIC BLOOD PRESSURE: 77 MMHG | SYSTOLIC BLOOD PRESSURE: 111 MMHG | RESPIRATION RATE: 18 BRPM | HEART RATE: 98 BPM

## 2024-04-14 DIAGNOSIS — J32.9 SINUSITIS, UNSPECIFIED CHRONICITY, UNSPECIFIED LOCATION: ICD-10-CM

## 2024-04-14 DIAGNOSIS — R09.81 NASAL CONGESTION: Primary | ICD-10-CM

## 2024-04-14 RX ORDER — CETIRIZINE HYDROCHLORIDE 10 MG/1
TABLET ORAL
COMMUNITY
Start: 2023-12-06

## 2024-04-14 RX ORDER — NAPROXEN 500 MG/1
TABLET ORAL
COMMUNITY
Start: 2023-11-07

## 2024-04-14 RX ORDER — CHLORHEXIDINE GLUCONATE ORAL RINSE 1.2 MG/ML
SOLUTION DENTAL
COMMUNITY
Start: 2024-02-21

## 2024-04-14 RX ORDER — AMOXICILLIN AND CLAVULANATE POTASSIUM 875; 125 MG/1; MG/1
1 TABLET, FILM COATED ORAL 2 TIMES DAILY
Qty: 14 TABLET | Refills: 0 | Status: SHIPPED | OUTPATIENT
Start: 2024-04-14 | End: 2024-04-21

## 2024-04-14 RX ORDER — PREDNISONE 20 MG/1
20 TABLET ORAL 2 TIMES DAILY
COMMUNITY
Start: 2023-12-06

## 2024-04-14 ASSESSMENT — PAIN SCALES - GENERAL: PAINLEVEL: 9

## 2024-04-14 ASSESSMENT — ENCOUNTER SYMPTOMS: SINUS PRESSURE: 1

## 2024-12-02 ENCOUNTER — WALK IN (OUTPATIENT)
Dept: URGENT CARE | Age: 49
End: 2024-12-02
Attending: NURSE PRACTITIONER

## 2024-12-02 VITALS
OXYGEN SATURATION: 97 % | TEMPERATURE: 97.6 F | RESPIRATION RATE: 18 BRPM | DIASTOLIC BLOOD PRESSURE: 85 MMHG | HEART RATE: 90 BPM | SYSTOLIC BLOOD PRESSURE: 129 MMHG

## 2024-12-02 DIAGNOSIS — R09.81 SINUS CONGESTION: ICD-10-CM

## 2024-12-02 DIAGNOSIS — R09.81 NASAL CONGESTION: Primary | ICD-10-CM

## 2024-12-02 DIAGNOSIS — Z87.09 HISTORY OF CHRONIC SINUSITIS: ICD-10-CM

## 2024-12-02 RX ORDER — METHYLPREDNISOLONE 4 MG/1
4 TABLET ORAL SEE ADMIN INSTRUCTIONS
Qty: 21 TABLET | Refills: 0 | Status: SHIPPED | OUTPATIENT
Start: 2024-12-02 | End: 2024-12-08

## 2024-12-02 ASSESSMENT — PAIN SCALES - GENERAL: PAINLEVEL: 0

## 2024-12-12 DIAGNOSIS — J32.9 CHRONIC SINUSITIS: Primary | ICD-10-CM

## 2025-01-02 ENCOUNTER — HOSPITAL ENCOUNTER (OUTPATIENT)
Dept: CT IMAGING | Age: 50
Discharge: HOME | End: 2025-01-02
Attending: OTOLARYNGOLOGY

## 2025-01-02 DIAGNOSIS — J32.9 CHRONIC SINUSITIS: ICD-10-CM

## 2025-01-02 PROCEDURE — 70486 CT MAXILLOFACIAL W/O DYE: CPT

## 2025-01-20 ENCOUNTER — HOSPITAL ENCOUNTER (OUTPATIENT)
Age: 50
End: 2025-01-20
Attending: OTOLARYNGOLOGY | Admitting: OTOLARYNGOLOGY

## 2025-02-13 VITALS — BODY MASS INDEX: 32.33 KG/M2 | HEIGHT: 75 IN | WEIGHT: 260 LBS

## 2025-02-13 SDOH — SOCIAL STABILITY: SOCIAL INSECURITY: HOW OFTEN DOES ANYONE, INCLUDING FAMILY AND FRIENDS, THREATEN YOU WITH HARM?: NEVER

## 2025-02-13 SDOH — SOCIAL STABILITY: SOCIAL INSECURITY: HOW OFTEN DOES ANYONE, INCLUDING FAMILY AND FRIENDS, PHYSICALLY HURT YOU?: NEVER

## 2025-02-13 SDOH — SOCIAL STABILITY: SOCIAL INSECURITY: HOW OFTEN DOES ANYONE, INCLUDING FAMILY AND FRIENDS, SCREAM OR CURSE AT YOU?: NEVER

## 2025-02-13 SDOH — SOCIAL STABILITY: SOCIAL INSECURITY: HOW OFTEN DOES ANYONE, INCLUDING FAMILY AND FRIENDS, INSULT OR TALK DOWN TO YOU?: NEVER

## 2025-02-18 ENCOUNTER — EXTERNAL LAB (OUTPATIENT)
Dept: HEALTH INFORMATION MANAGEMENT | Facility: OTHER | Age: 50
End: 2025-02-18

## 2025-02-18 LAB
ALBUMIN SERPL-MCNC: 4.4 G/DL (ref 4.1–5.1)
ALP SERPL-CCNC: 101 IU/L (ref 44–121)
ALT SERPL-CCNC: 41 IU/L (ref 0–44)
APTT PPP: 26 SEC (ref 24–33)
AST SERPL-CCNC: 27 IU/L (ref 0–40)
BILIRUB SERPL-MCNC: 0.3 MG/DL (ref 0–1.2)
BUN SERPL-MCNC: 15 MG/DL (ref 6–24)
BUN/CREAT SERPL: 18 (ref 9–20)
CALCIUM SERPL-MCNC: 9.5 MG/DL (ref 8.7–10.2)
CHLORIDE SERPL-SCNC: 102 MMOL/L (ref 96–106)
CHOLEST SERPL-MCNC: 227 MG/DL (ref 100–199)
CO2 SERPL-SCNC: 25 MMOL/L (ref 20–29)
CREAT SERPL-MCNC: 0.82 MG/DL (ref 0.76–1.27)
ERYTHROCYTE [DISTWIDTH] IN BLOOD: 13.4 % (ref 11.6–15.4)
GFR SERPLBLD SCHWARTZ-ARVRAT: 108 ML/MIN/1.73
GLOBULIN SER-MCNC: 2.7 G/DL (ref 1.5–4.5)
GLUCOSE SERPL-MCNC: 101 MG/DL (ref 70–99)
HBA1C MFR BLD: 6 % (ref 4.8–5.6)
HCT VFR BLD CALC: 45.2 % (ref 37.5–51)
HDLC SERPL-MCNC: 34 MG/DL
HGB BLD-MCNC: 15.4 G/DL (ref 13–17.7)
INR PPP: 1.1 (ref 0.9–1.2)
LDLC SERPL CALC-MCNC: 106 MG/DL (ref 0–99)
LENGTH OF FAST TIME PATIENT: ABNORMAL H
LENGTH OF FAST TIME PATIENT: ABNORMAL H
MCH RBC QN AUTO: 31.3 PG (ref 26.6–33)
MCHC RBC AUTO-ENTMCNC: 34.1 G/DL (ref 31.5–35.7)
MCV RBC AUTO: 92 FL (ref 79–97)
PLATELET # BLD: 222 X10E3/UL (ref 150–450)
POTASSIUM SERPL-SCNC: 4.2 MMOL/L (ref 3.5–5.2)
PROT SERPL-MCNC: 7.1 G/DL (ref 6–8.5)
PROTHROMBIN TIME: 12 SEC (ref 9.1–12)
RBC # BLD: 4.92 X10E6/UL (ref 4.14–5.8)
SODIUM SERPL-SCNC: 140 MMOL/L (ref 134–144)
TRIGL SERPL-MCNC: 507 MG/DL (ref 0–149)
VLDLC SERPL CALC-MCNC: 87 MG/DL (ref 5–40)
WBC # BLD: 5.8 X10E3/UL (ref 3.4–10.8)

## 2025-03-05 ENCOUNTER — WALK IN (OUTPATIENT)
Dept: URGENT CARE | Age: 50
End: 2025-03-05
Attending: FAMILY MEDICINE

## 2025-03-05 VITALS
HEART RATE: 84 BPM | SYSTOLIC BLOOD PRESSURE: 127 MMHG | TEMPERATURE: 97.6 F | RESPIRATION RATE: 18 BRPM | OXYGEN SATURATION: 97 % | DIASTOLIC BLOOD PRESSURE: 82 MMHG

## 2025-03-05 DIAGNOSIS — J32.9 CHRONIC SINUSITIS, UNSPECIFIED LOCATION: Primary | ICD-10-CM

## 2025-03-05 ASSESSMENT — ENCOUNTER SYMPTOMS
ACTIVITY CHANGE: 0
NAUSEA: 0
DIARRHEA: 0
ABDOMINAL DISTENTION: 0
EYE PAIN: 0
CHEST TIGHTNESS: 0
ABDOMINAL PAIN: 0
WHEEZING: 0
FEVER: 0
SINUS PAIN: 0
RHINORRHEA: 0
ADENOPATHY: 0
VOMITING: 0
COUGH: 0
CHILLS: 0
SHORTNESS OF BREATH: 0
NEUROLOGICAL NEGATIVE: 1
AGITATION: 0
SORE THROAT: 0

## 2025-04-15 DIAGNOSIS — G47.33 OSA (OBSTRUCTIVE SLEEP APNEA): Primary | ICD-10-CM

## 2025-04-21 ENCOUNTER — TELEPHONE (OUTPATIENT)
Dept: SLEEP MEDICINE | Age: 50
End: 2025-04-21

## 2025-04-23 ENCOUNTER — E-ADVICE (OUTPATIENT)
Dept: SLEEP MEDICINE | Age: 50
End: 2025-04-23

## 2025-05-07 ENCOUNTER — APPOINTMENT (OUTPATIENT)
Dept: SLEEP MEDICINE | Age: 50
End: 2025-05-07
Attending: OTOLARYNGOLOGY

## 2025-05-07 RX ORDER — CLOTRIMAZOLE 1 %
CREAM (GRAM) TOPICAL
COMMUNITY
Start: 2025-03-05

## 2025-05-07 RX ORDER — FLUTICASONE PROPIONATE 50 MCG
1 SPRAY, SUSPENSION (ML) NASAL DAILY
COMMUNITY
Start: 2024-12-24

## 2025-05-09 ENCOUNTER — TELEPHONE (OUTPATIENT)
Dept: SLEEP MEDICINE | Age: 50
End: 2025-05-09

## 2025-05-13 ENCOUNTER — HOSPITAL ENCOUNTER (EMERGENCY)
Age: 50
Discharge: HOME | End: 2025-05-13

## 2025-05-13 VITALS
WEIGHT: 269.51 LBS | HEIGHT: 75 IN | SYSTOLIC BLOOD PRESSURE: 150 MMHG | BODY MASS INDEX: 33.51 KG/M2 | TEMPERATURE: 98.4 F | RESPIRATION RATE: 16 BRPM | HEART RATE: 91 BPM | OXYGEN SATURATION: 95 % | DIASTOLIC BLOOD PRESSURE: 80 MMHG

## 2025-05-13 DIAGNOSIS — J02.9 VIRAL PHARYNGITIS: Primary | ICD-10-CM

## 2025-05-13 LAB
HETEROPH AB SERPL QL IA: NEGATIVE
S PYO DNA THROAT QL NAA+PROBE: NOT DETECTED

## 2025-05-13 PROCEDURE — 10002800 HB RX 250 W HCPCS

## 2025-05-13 PROCEDURE — 10002803 HB RX 637

## 2025-05-13 PROCEDURE — 86308 HETEROPHILE ANTIBODY SCREEN: CPT

## 2025-05-13 PROCEDURE — 87651 STREP A DNA AMP PROBE: CPT

## 2025-05-13 PROCEDURE — 10002801 HB RX 250 W/O HCPCS

## 2025-05-13 PROCEDURE — 99283 EMERGENCY DEPT VISIT LOW MDM: CPT

## 2025-05-13 RX ORDER — IBUPROFEN 600 MG/1
600 TABLET, FILM COATED ORAL EVERY 6 HOURS PRN
Qty: 28 TABLET | Refills: 0 | Status: SHIPPED | OUTPATIENT
Start: 2025-05-13 | End: 2025-05-20

## 2025-05-13 RX ORDER — DEXAMETHASONE SODIUM PHOSPHATE 4 MG/ML
10 INJECTION, SOLUTION INTRA-ARTICULAR; INTRALESIONAL; INTRAMUSCULAR; INTRAVENOUS; SOFT TISSUE ONCE
Status: COMPLETED | OUTPATIENT
Start: 2025-05-13 | End: 2025-05-13

## 2025-05-13 RX ORDER — LIDOCAINE HYDROCHLORIDE 20 MG/ML
15 SOLUTION OROPHARYNGEAL ONCE
Status: COMPLETED | OUTPATIENT
Start: 2025-05-13 | End: 2025-05-13

## 2025-05-13 RX ORDER — LIDOCAINE HYDROCHLORIDE 20 MG/ML
10 SOLUTION OROPHARYNGEAL 2 TIMES DAILY PRN
Qty: 200 ML | Refills: 0 | Status: SHIPPED | OUTPATIENT
Start: 2025-05-13

## 2025-05-13 RX ORDER — IBUPROFEN 600 MG/1
600 TABLET, FILM COATED ORAL ONCE
Status: COMPLETED | OUTPATIENT
Start: 2025-05-13 | End: 2025-05-13

## 2025-05-13 RX ADMIN — IBUPROFEN 600 MG: 600 TABLET, FILM COATED ORAL at 10:40

## 2025-05-13 RX ADMIN — LIDOCAINE HYDROCHLORIDE 15 ML: 20 SOLUTION ORAL; TOPICAL at 10:42

## 2025-05-13 RX ADMIN — DEXAMETHASONE SODIUM PHOSPHATE 10 MG: 4 INJECTION INTRA-ARTICULAR; INTRALESIONAL; INTRAMUSCULAR; INTRAVENOUS; SOFT TISSUE at 10:38

## 2025-05-13 ASSESSMENT — PAIN SCALES - GENERAL
PAINLEVEL_OUTOF10: 7
PAINLEVEL_OUTOF10: 9

## 2025-05-14 ENCOUNTER — HOSPITAL ENCOUNTER (OUTPATIENT)
Age: 50
End: 2025-05-14
Attending: OTOLARYNGOLOGY | Admitting: OTOLARYNGOLOGY

## 2025-05-16 ENCOUNTER — HOSPITAL ENCOUNTER (EMERGENCY)
Age: 50
Discharge: HOME | End: 2025-05-16
Attending: STUDENT IN AN ORGANIZED HEALTH CARE EDUCATION/TRAINING PROGRAM

## 2025-05-16 ENCOUNTER — APPOINTMENT (OUTPATIENT)
Dept: CT IMAGING | Age: 50
End: 2025-05-16
Attending: EMERGENCY MEDICINE

## 2025-05-16 VITALS
BODY MASS INDEX: 33.99 KG/M2 | SYSTOLIC BLOOD PRESSURE: 150 MMHG | WEIGHT: 273.37 LBS | HEIGHT: 75 IN | RESPIRATION RATE: 18 BRPM | OXYGEN SATURATION: 94 % | TEMPERATURE: 99.1 F | HEART RATE: 91 BPM | DIASTOLIC BLOOD PRESSURE: 97 MMHG

## 2025-05-16 DIAGNOSIS — J02.9 PHARYNGITIS, UNSPECIFIED ETIOLOGY: Primary | ICD-10-CM

## 2025-05-16 LAB
ALBUMIN SERPL-MCNC: 3.7 G/DL (ref 3.4–5)
ALBUMIN/GLOB SERPL: 1.1 {RATIO} (ref 1–2.4)
ALP SERPL-CCNC: 108 UNITS/L (ref 45–117)
ALT SERPL-CCNC: 57 UNITS/L
ANION GAP SERPL CALC-SCNC: 11 MMOL/L (ref 7–19)
AST SERPL-CCNC: 33 UNITS/L
BASOPHILS # BLD: 0.1 K/MCL (ref 0–0.3)
BASOPHILS NFR BLD: 1 %
BILIRUB SERPL-MCNC: 0.4 MG/DL (ref 0.2–1)
BUN SERPL-MCNC: 15 MG/DL (ref 6–20)
BUN/CREAT SERPL: 17 (ref 7–25)
CALCIUM SERPL-MCNC: 8.7 MG/DL (ref 8.4–10.2)
CHLORIDE SERPL-SCNC: 104 MMOL/L (ref 97–110)
CO2 SERPL-SCNC: 28 MMOL/L (ref 21–32)
CREAT SERPL-MCNC: 0.86 MG/DL (ref 0.67–1.17)
CRP SERPL-MCNC: 26.7 MG/L
DEPRECATED RDW RBC: 43 FL (ref 39–50)
EBV VCA IGG SER QL IA: 509 U/ML
EBV VCA IGM SER QL IA: 10 U/ML
EGFRCR SERPLBLD CKD-EPI 2021: >90 ML/MIN/{1.73_M2}
EOSINOPHIL # BLD: 0.2 K/MCL (ref 0–0.5)
EOSINOPHIL NFR BLD: 2 %
ERYTHROCYTE [DISTWIDTH] IN BLOOD: 13.1 % (ref 11–15)
ERYTHROCYTE [SEDIMENTATION RATE] IN BLOOD BY WESTERGREN METHOD: 16 MM/HR (ref 0–20)
FASTING DURATION TIME PATIENT: ABNORMAL H
FLUAV RNA RESP QL NAA+PROBE: NOT DETECTED
FLUBV RNA RESP QL NAA+PROBE: NOT DETECTED
GLOBULIN SER-MCNC: 3.3 G/DL (ref 2–4)
GLUCOSE SERPL-MCNC: 129 MG/DL (ref 70–99)
HCT VFR BLD CALC: 40 % (ref 39–51)
HETEROPH AB SERPL QL IA: NEGATIVE
HGB BLD-MCNC: 14.1 G/DL (ref 13–17)
IMM GRANULOCYTES # BLD AUTO: 0 K/MCL (ref 0–0.2)
IMM GRANULOCYTES # BLD: 0 %
LYMPHOCYTES # BLD: 1.7 K/MCL (ref 1–4.8)
LYMPHOCYTES NFR BLD: 19 %
MCH RBC QN AUTO: 31.5 PG (ref 26–34)
MCHC RBC AUTO-ENTMCNC: 35.3 G/DL (ref 32–36.5)
MCV RBC AUTO: 89.5 FL (ref 78–100)
MONOCYTES # BLD: 0.7 K/MCL (ref 0.3–0.9)
MONOCYTES NFR BLD: 8 %
NEUTROPHILS # BLD: 6.4 K/MCL (ref 1.8–7.7)
NEUTROPHILS NFR BLD: 70 %
NRBC BLD MANUAL-RTO: 0 /100 WBC
PLATELET # BLD AUTO: 225 K/MCL (ref 140–450)
POTASSIUM SERPL-SCNC: 3.5 MMOL/L (ref 3.4–5.1)
PROT SERPL-MCNC: 7 G/DL (ref 6.4–8.2)
RBC # BLD: 4.47 MIL/MCL (ref 4.5–5.9)
RSV AG NPH QL IA.RAPID: NOT DETECTED
S PYO DNA THROAT QL NAA+PROBE: NOT DETECTED
SARS-COV-2 RNA RESP QL NAA+PROBE: NOT DETECTED
SERVICE CMNT-IMP: NORMAL
SERVICE CMNT-IMP: NORMAL
SODIUM SERPL-SCNC: 139 MMOL/L (ref 135–145)
WBC # BLD: 9.1 K/MCL (ref 4.2–11)

## 2025-05-16 PROCEDURE — 0241U COVID/FLU/RSV PANEL: CPT | Performed by: STUDENT IN AN ORGANIZED HEALTH CARE EDUCATION/TRAINING PROGRAM

## 2025-05-16 PROCEDURE — 96374 THER/PROPH/DIAG INJ IV PUSH: CPT

## 2025-05-16 PROCEDURE — 70491 CT SOFT TISSUE NECK W/DYE: CPT

## 2025-05-16 PROCEDURE — 80053 COMPREHEN METABOLIC PANEL: CPT | Performed by: EMERGENCY MEDICINE

## 2025-05-16 PROCEDURE — 10002800 HB RX 250 W HCPCS: Performed by: EMERGENCY MEDICINE

## 2025-05-16 PROCEDURE — 85652 RBC SED RATE AUTOMATED: CPT | Performed by: STUDENT IN AN ORGANIZED HEALTH CARE EDUCATION/TRAINING PROGRAM

## 2025-05-16 PROCEDURE — 86665 EPSTEIN-BARR CAPSID VCA: CPT | Performed by: STUDENT IN AN ORGANIZED HEALTH CARE EDUCATION/TRAINING PROGRAM

## 2025-05-16 PROCEDURE — 86308 HETEROPHILE ANTIBODY SCREEN: CPT | Performed by: STUDENT IN AN ORGANIZED HEALTH CARE EDUCATION/TRAINING PROGRAM

## 2025-05-16 PROCEDURE — 86140 C-REACTIVE PROTEIN: CPT | Performed by: STUDENT IN AN ORGANIZED HEALTH CARE EDUCATION/TRAINING PROGRAM

## 2025-05-16 PROCEDURE — 10002805 HB CONTRAST AGENT: Performed by: EMERGENCY MEDICINE

## 2025-05-16 PROCEDURE — 85025 COMPLETE CBC W/AUTO DIFF WBC: CPT | Performed by: EMERGENCY MEDICINE

## 2025-05-16 PROCEDURE — 10002807 HB RX 258: Performed by: EMERGENCY MEDICINE

## 2025-05-16 PROCEDURE — 96375 TX/PRO/DX INJ NEW DRUG ADDON: CPT

## 2025-05-16 PROCEDURE — 87651 STREP A DNA AMP PROBE: CPT | Performed by: STUDENT IN AN ORGANIZED HEALTH CARE EDUCATION/TRAINING PROGRAM

## 2025-05-16 PROCEDURE — 99284 EMERGENCY DEPT VISIT MOD MDM: CPT | Performed by: STUDENT IN AN ORGANIZED HEALTH CARE EDUCATION/TRAINING PROGRAM

## 2025-05-16 PROCEDURE — 10002800 HB RX 250 W HCPCS: Performed by: STUDENT IN AN ORGANIZED HEALTH CARE EDUCATION/TRAINING PROGRAM

## 2025-05-16 RX ORDER — DEXAMETHASONE SODIUM PHOSPHATE 10 MG/ML
10 INJECTION, SOLUTION INTRAMUSCULAR; INTRAVENOUS ONCE
Status: COMPLETED | OUTPATIENT
Start: 2025-05-16 | End: 2025-05-16

## 2025-05-16 RX ORDER — KETOROLAC TROMETHAMINE 15 MG/ML
15 INJECTION, SOLUTION INTRAMUSCULAR; INTRAVENOUS ONCE
Status: COMPLETED | OUTPATIENT
Start: 2025-05-16 | End: 2025-05-16

## 2025-05-16 RX ORDER — HYDROCODONE BITARTRATE AND ACETAMINOPHEN 5; 325 MG/1; MG/1
1 TABLET ORAL EVERY 6 HOURS PRN
Qty: 20 TABLET | Refills: 0 | Status: SHIPPED | OUTPATIENT
Start: 2025-05-16

## 2025-05-16 RX ADMIN — DEXAMETHASONE SODIUM PHOSPHATE 10 MG: 10 INJECTION INTRAMUSCULAR; INTRAVENOUS at 04:27

## 2025-05-16 RX ADMIN — MORPHINE SULFATE 4 MG: 4 INJECTION INTRAVENOUS at 04:27

## 2025-05-16 RX ADMIN — SODIUM CHLORIDE 1000 ML: 9 INJECTION, SOLUTION INTRAVENOUS at 04:39

## 2025-05-16 RX ADMIN — IOHEXOL 75 ML: 350 INJECTION, SOLUTION INTRAVENOUS at 04:58

## 2025-05-16 RX ADMIN — AMPICILLIN SODIUM AND SULBACTAM SODIUM 3 G: 2; 1 INJECTION, POWDER, FOR SOLUTION INTRAMUSCULAR; INTRAVENOUS at 04:40

## 2025-05-16 RX ADMIN — KETOROLAC TROMETHAMINE 15 MG: 15 INJECTION, SOLUTION INTRAMUSCULAR; INTRAVENOUS at 04:27

## 2025-05-16 ASSESSMENT — PAIN SCALES - GENERAL: PAINLEVEL_OUTOF10: 9

## 2025-05-25 ENCOUNTER — WALK IN (OUTPATIENT)
Dept: URGENT CARE | Age: 50
End: 2025-05-25
Attending: EMERGENCY MEDICINE

## 2025-05-25 ENCOUNTER — HOSPITAL ENCOUNTER (OUTPATIENT)
Dept: GENERAL RADIOLOGY | Age: 50
End: 2025-05-25
Attending: EMERGENCY MEDICINE

## 2025-05-25 VITALS
HEART RATE: 90 BPM | SYSTOLIC BLOOD PRESSURE: 130 MMHG | TEMPERATURE: 97.4 F | DIASTOLIC BLOOD PRESSURE: 89 MMHG | RESPIRATION RATE: 18 BRPM | OXYGEN SATURATION: 97 %

## 2025-05-25 DIAGNOSIS — R05.3 COUGH, PERSISTENT: ICD-10-CM

## 2025-05-25 DIAGNOSIS — R05.3 COUGH, PERSISTENT: Primary | ICD-10-CM

## 2025-05-25 PROCEDURE — 71046 X-RAY EXAM CHEST 2 VIEWS: CPT

## 2025-05-25 ASSESSMENT — ENCOUNTER SYMPTOMS
ABDOMINAL PAIN: 0
BACK PAIN: 0
SHORTNESS OF BREATH: 0
HEADACHES: 0
CONFUSION: 0
FEVER: 0
COUGH: 1
VOMITING: 0
NAUSEA: 0
CHILLS: 0
SINUS PAIN: 0

## 2025-05-30 DIAGNOSIS — G47.33 OSA (OBSTRUCTIVE SLEEP APNEA): Primary | ICD-10-CM

## 2025-06-03 ENCOUNTER — OFFICE VISIT (OUTPATIENT)
Dept: SLEEP MEDICINE | Age: 50
End: 2025-06-03
Attending: OTOLARYNGOLOGY

## 2025-06-03 VITALS
DIASTOLIC BLOOD PRESSURE: 86 MMHG | TEMPERATURE: 97.7 F | BODY MASS INDEX: 34.26 KG/M2 | WEIGHT: 275.57 LBS | HEIGHT: 75 IN | OXYGEN SATURATION: 95 % | HEART RATE: 84 BPM | SYSTOLIC BLOOD PRESSURE: 127 MMHG

## 2025-06-03 DIAGNOSIS — G47.33 OBSTRUCTIVE SLEEP APNEA (ADULT) (PEDIATRIC): Primary | ICD-10-CM

## 2025-06-03 PROCEDURE — 99204 OFFICE O/P NEW MOD 45 MIN: CPT | Performed by: NURSE PRACTITIONER

## 2025-06-03 PROCEDURE — 99202 OFFICE O/P NEW SF 15 MIN: CPT | Performed by: NURSE PRACTITIONER

## 2025-06-03 ASSESSMENT — ENCOUNTER SYMPTOMS
INSOMNIA: 1
EYES NEGATIVE: 1
GASTROINTESTINAL NEGATIVE: 1
NERVOUS/ANXIOUS: 1
SLEEP DISTURBANCES DUE TO BREATHING: 1
CONSTITUTIONAL NEGATIVE: 1
ALLERGIC/IMMUNOLOGIC NEGATIVE: 1
ENDOCRINE NEGATIVE: 1
EXCESSIVE DAYTIME SLEEPINESS: 1
HEMATOLOGIC/LYMPHATIC NEGATIVE: 1
HEADACHES: 1
SNORING: 1

## 2025-07-27 ENCOUNTER — APPOINTMENT (OUTPATIENT)
Dept: CT IMAGING | Age: 50
End: 2025-07-27

## 2025-07-27 ENCOUNTER — HOSPITAL ENCOUNTER (EMERGENCY)
Age: 50
Discharge: HOME | End: 2025-07-27

## 2025-07-27 VITALS
WEIGHT: 260 LBS | SYSTOLIC BLOOD PRESSURE: 134 MMHG | BODY MASS INDEX: 32.33 KG/M2 | RESPIRATION RATE: 16 BRPM | TEMPERATURE: 98 F | HEART RATE: 93 BPM | DIASTOLIC BLOOD PRESSURE: 96 MMHG | OXYGEN SATURATION: 98 % | HEIGHT: 75 IN

## 2025-07-27 DIAGNOSIS — S09.93XA FACIAL INJURY, INITIAL ENCOUNTER: Primary | ICD-10-CM

## 2025-07-27 DIAGNOSIS — S09.90XA INJURY OF HEAD, INITIAL ENCOUNTER: ICD-10-CM

## 2025-07-27 DIAGNOSIS — T07.XXXA ABRASIONS OF MULTIPLE SITES: ICD-10-CM
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
## 2025-07-27 PROCEDURE — 70450 CT HEAD/BRAIN W/O DYE: CPT

## 2025-07-27 PROCEDURE — 10002800 HB RX 250 W HCPCS: Performed by: STUDENT IN AN ORGANIZED HEALTH CARE EDUCATION/TRAINING PROGRAM

## 2025-07-27 PROCEDURE — 90715 TDAP VACCINE 7 YRS/> IM: CPT | Performed by: STUDENT IN AN ORGANIZED HEALTH CARE EDUCATION/TRAINING PROGRAM

## 2025-07-27 PROCEDURE — 10004651 HB RX, NO CHARGE ITEM: Performed by: STUDENT IN AN ORGANIZED HEALTH CARE EDUCATION/TRAINING PROGRAM

## 2025-07-27 PROCEDURE — 70486 CT MAXILLOFACIAL W/O DYE: CPT

## 2025-07-27 PROCEDURE — 90471 IMMUNIZATION ADMIN: CPT | Performed by: STUDENT IN AN ORGANIZED HEALTH CARE EDUCATION/TRAINING PROGRAM

## 2025-07-27 PROCEDURE — 99284 EMERGENCY DEPT VISIT MOD MDM: CPT

## 2025-07-27 PROCEDURE — A9150 MISC/EXPER NON-PRESCRIPT DRU: HCPCS | Performed by: STUDENT IN AN ORGANIZED HEALTH CARE EDUCATION/TRAINING PROGRAM

## 2025-07-27 RX ORDER — HYDROCODONE BITARTRATE AND ACETAMINOPHEN 7.5; 325 MG/1; MG/1
1 TABLET ORAL ONCE
Refills: 0 | Status: DISCONTINUED | OUTPATIENT
Start: 2025-07-27 | End: 2025-07-27

## 2025-07-27 RX ORDER — ACETAMINOPHEN 500 MG
1000 TABLET ORAL ONCE
Status: COMPLETED | OUTPATIENT
Start: 2025-07-27 | End: 2025-07-27

## 2025-07-27 RX ADMIN — ACETAMINOPHEN 1000 MG: 500 TABLET ORAL at 17:59

## 2025-07-27 RX ADMIN — TETANUS TOXOID, REDUCED DIPHTHERIA TOXOID AND ACELLULAR PERTUSSIS VACCINE, ADSORBED 0.5 ML: 5; 2.5; 8; 8; 2.5 SUSPENSION INTRAMUSCULAR at 19:02

## 2025-07-27 ASSESSMENT — PAIN SCALES - GENERAL: PAINLEVEL_OUTOF10: 6

## 2025-08-05 ENCOUNTER — TELEPHONE (OUTPATIENT)
Dept: EMERGENCY MEDICINE | Age: 50
End: 2025-08-05